# Patient Record
Sex: FEMALE | Race: BLACK OR AFRICAN AMERICAN | NOT HISPANIC OR LATINO | Employment: UNEMPLOYED | URBAN - METROPOLITAN AREA
[De-identification: names, ages, dates, MRNs, and addresses within clinical notes are randomized per-mention and may not be internally consistent; named-entity substitution may affect disease eponyms.]

---
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
## 2023-04-10 ENCOUNTER — APPOINTMENT (OUTPATIENT)
Dept: URBAN - METROPOLITAN AREA CLINIC 258 | Age: 18
Setting detail: DERMATOLOGY
End: 2023-04-10

## 2023-04-10 VITALS — WEIGHT: 125 LBS | HEIGHT: 60 IN

## 2023-04-10 DIAGNOSIS — L68.0 HIRSUTISM: ICD-10-CM

## 2023-04-10 DIAGNOSIS — L21.8 OTHER SEBORRHEIC DERMATITIS: ICD-10-CM

## 2023-04-10 PROCEDURE — OTHER PRESCRIPTION: OTHER

## 2023-04-10 PROCEDURE — OTHER COUNSELING: OTHER

## 2023-04-10 PROCEDURE — OTHER ADDITIONAL NOTES: OTHER

## 2023-04-10 PROCEDURE — OTHER MIPS QUALITY: OTHER

## 2023-04-10 PROCEDURE — 99203 OFFICE O/P NEW LOW 30 MIN: CPT

## 2023-04-10 RX ORDER — DESONIDE 0.5 MG/G
CREAM TOPICAL BID
Qty: 15 | Refills: 2 | Status: ERX | COMMUNITY
Start: 2023-04-10

## 2023-04-10 ASSESSMENT — LOCATION ZONE DERM: LOCATION ZONE: FACE

## 2023-04-10 ASSESSMENT — LOCATION DETAILED DESCRIPTION DERM
LOCATION DETAILED: RIGHT MEDIAL MALAR CHEEK
LOCATION DETAILED: RIGHT CENTRAL BUCCAL CHEEK

## 2023-04-10 ASSESSMENT — LOCATION SIMPLE DESCRIPTION DERM: LOCATION SIMPLE: RIGHT CHEEK

## 2023-04-10 NOTE — PROCEDURE: ADDITIONAL NOTES
Additional Notes: Patient recommended to see a gynecologist to rule out any underlying conditions that are causing hair growth. (She is recommended to obtain referral from her primary). Patient is to follow up with us after.
Anemia    Glaucoma    High cholesterol    HTN (hypertension)    Hypothyroid
Render Risk Assessment In Note?: no
Detail Level: Simple